# Patient Record
Sex: MALE | Race: BLACK OR AFRICAN AMERICAN | NOT HISPANIC OR LATINO | Employment: FULL TIME | RURAL
[De-identification: names, ages, dates, MRNs, and addresses within clinical notes are randomized per-mention and may not be internally consistent; named-entity substitution may affect disease eponyms.]

---

## 2021-12-07 ENCOUNTER — LAB VISIT (OUTPATIENT)
Dept: PRIMARY CARE CLINIC | Facility: CLINIC | Age: 31
End: 2021-12-07

## 2021-12-07 DIAGNOSIS — Z02.83 ENCOUNTER FOR EMPLOYMENT-RELATED DRUG TESTING: ICD-10-CM

## 2021-12-07 PROCEDURE — 99000 SPECIMEN HANDLING OFFICE-LAB: CPT | Mod: ,,, | Performed by: NURSE PRACTITIONER

## 2021-12-07 PROCEDURE — 99000 PR SPECIMEN HANDLING,DR OFF->LAB: ICD-10-PCS | Mod: ,,, | Performed by: NURSE PRACTITIONER

## 2022-01-08 ENCOUNTER — OFFICE VISIT (OUTPATIENT)
Dept: FAMILY MEDICINE | Facility: CLINIC | Age: 32
End: 2022-01-08
Payer: COMMERCIAL

## 2022-01-08 VITALS
WEIGHT: 216 LBS | HEART RATE: 107 BPM | DIASTOLIC BLOOD PRESSURE: 77 MMHG | SYSTOLIC BLOOD PRESSURE: 125 MMHG | TEMPERATURE: 98 F | OXYGEN SATURATION: 96 % | RESPIRATION RATE: 18 BRPM

## 2022-01-08 DIAGNOSIS — Z20.828 EXPOSURE TO VIRAL DISEASE: Primary | ICD-10-CM

## 2022-01-08 LAB
CTP QC/QA: YES
SARS-COV-2 AG RESP QL IA.RAPID: NEGATIVE

## 2022-01-08 PROCEDURE — 87426 SARS CORONAVIRUS 2 ANTIGEN POCT: ICD-10-PCS | Mod: QW,,, | Performed by: NURSE PRACTITIONER

## 2022-01-08 PROCEDURE — 99202 PR OFFICE/OUTPT VISIT, NEW, LEVL II, 15-29 MIN: ICD-10-PCS | Mod: ,,, | Performed by: NURSE PRACTITIONER

## 2022-01-08 PROCEDURE — 99051 MED SERV EVE/WKEND/HOLIDAY: CPT | Mod: ,,, | Performed by: NURSE PRACTITIONER

## 2022-01-08 PROCEDURE — 99051 PR MEDICAL SERVICES, EVE/WKEND/HOLIDAY: ICD-10-PCS | Mod: ,,, | Performed by: NURSE PRACTITIONER

## 2022-01-08 PROCEDURE — 99202 OFFICE O/P NEW SF 15 MIN: CPT | Mod: ,,, | Performed by: NURSE PRACTITIONER

## 2022-01-08 PROCEDURE — 87426 SARSCOV CORONAVIRUS AG IA: CPT | Mod: QW,,, | Performed by: NURSE PRACTITIONER

## 2022-01-08 NOTE — PROGRESS NOTES
Subjective:       Patient ID: Alan Pretty is a 31 y.o. male.    Chief Complaint: COVID-19 Concerns (Exposed to positive. No symptoms.)    Exposure to Covid  No symptoms    Review of Systems   Constitutional: Negative for appetite change, fatigue and fever.   HENT: Negative for nasal congestion, ear pain and sore throat.    Eyes: Negative for pain, discharge and itching.   Respiratory: Negative for cough and shortness of breath.    Cardiovascular: Negative for chest pain and leg swelling.   Gastrointestinal: Negative for abdominal pain, change in bowel habit, nausea, vomiting and change in bowel habit.   Musculoskeletal: Negative for back pain, gait problem and neck pain.   Integumentary:  Negative for rash and wound.   Allergic/Immunologic: Negative for immunocompromised state.   Neurological: Negative for dizziness, weakness and headaches.   All other systems reviewed and are negative.        Objective:      Physical Exam  Vitals and nursing note reviewed.   Constitutional:       General: He is not in acute distress.     Appearance: Normal appearance. He is not ill-appearing, toxic-appearing or diaphoretic.   HENT:      Head: Normocephalic.      Right Ear: Tympanic membrane, ear canal and external ear normal.      Left Ear: Tympanic membrane, ear canal and external ear normal.      Nose: Nose normal. No congestion or rhinorrhea.      Mouth/Throat:      Mouth: Mucous membranes are moist.      Pharynx: Posterior oropharyngeal erythema present. No oropharyngeal exudate.   Eyes:      General: No scleral icterus.        Right eye: No discharge.         Left eye: No discharge.      Extraocular Movements: Extraocular movements intact.      Conjunctiva/sclera: Conjunctivae normal.      Pupils: Pupils are equal, round, and reactive to light.   Cardiovascular:      Rate and Rhythm: Normal rate and regular rhythm.      Pulses: Normal pulses.      Heart sounds: Normal heart sounds. No murmur heard.      Pulmonary:       Effort: Pulmonary effort is normal. No respiratory distress.      Breath sounds: Normal breath sounds. No wheezing, rhonchi or rales.   Musculoskeletal:         General: Normal range of motion.      Cervical back: Neck supple. No tenderness.   Lymphadenopathy:      Cervical: No cervical adenopathy.   Skin:     General: Skin is warm and dry.      Capillary Refill: Capillary refill takes less than 2 seconds.      Findings: No rash.   Neurological:      Mental Status: He is alert and oriented to person, place, and time.   Psychiatric:         Mood and Affect: Mood normal.         Behavior: Behavior normal.         Thought Content: Thought content normal.         Judgment: Judgment normal.         Office Visit on 01/08/2022   Component Date Value Ref Range Status    SARS Coronavirus 2 Antigen 01/08/2022 Negative  Negative Final     Acceptable 01/08/2022 Yes   Final      Assessment:       1. Exposure to viral disease        Plan:   Exposure to viral disease  -     SARS Coronavirus 2 Antigen, POCT

## 2022-01-08 NOTE — PATIENT INSTRUCTIONS
Your test was NEGATIVE for COVID-19 (coronavirus).      You may leave home and/or return to work when the following conditions are met:  · 24 hours fever free without fever-reducing medications AND  · Improved symptoms  · You are fully vaccinated or have not had close contact with someone with COVID-19 (within 6 feet for 15 minutes or more)    If you are fully vaccinated and had a close contact, there is no need for quarantine, unless you develop symptoms.      If you are not fully vaccinated and had a close contact:  · Retest at 5 to 7 days post-exposure  · If possible, it is recommended that you quarantine for 5 days from the time of contact regardless of your test status.  · A mask should be worn indoors post quarantine.    If your symptoms worsen or if you have any other concerns, please contact [facility] at [phone].     Sincerely,    CHARITY Gee

## 2022-01-08 NOTE — LETTER
January 8, 2022      [facility]  [address] [place] [address]  Phone: [phone]  Fax: [phone]       Patient: Alan Pretty   YOB: 1990  Date of Visit: 01/08/2022    To Whom It May Concern:    TROY Pretty  was at [facility] on 01/08/2022. The patient may return to work/school on 01/09/2022 without restrictions. If you have any questions or concerns, or if I can be of further assistance, please do not hesitate to contact me.    Sincerely,    CHARITY Gee

## 2022-04-02 ENCOUNTER — HOSPITAL ENCOUNTER (EMERGENCY)
Facility: HOSPITAL | Age: 32
Discharge: HOME | End: 2022-04-02
Payer: COMMERCIAL

## 2022-04-02 VITALS
WEIGHT: 198 LBS | HEART RATE: 89 BPM | TEMPERATURE: 99 F | BODY MASS INDEX: 26.82 KG/M2 | DIASTOLIC BLOOD PRESSURE: 74 MMHG | RESPIRATION RATE: 16 BRPM | HEIGHT: 72 IN | OXYGEN SATURATION: 97 % | SYSTOLIC BLOOD PRESSURE: 126 MMHG
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
## 2022-04-02 DIAGNOSIS — Z20.828 EXPOSURE TO VIRAL DISEASE: ICD-10-CM

## 2022-04-02 DIAGNOSIS — M25.529 ELBOW PAIN, UNSPECIFIED LATERALITY: Primary | ICD-10-CM

## 2022-04-02 PROCEDURE — 99283 EMERGENCY DEPT VISIT LOW MDM: CPT

## 2022-04-02 PROCEDURE — 99282 PR EMERGENCY DEPT VISIT,LEVEL II: ICD-10-PCS | Mod: ,,, | Performed by: NURSE PRACTITIONER

## 2022-04-02 PROCEDURE — 99282 EMERGENCY DEPT VISIT SF MDM: CPT | Mod: ,,, | Performed by: NURSE PRACTITIONER

## 2022-04-02 NOTE — Clinical Note
"Alan GLASSDEBRA" Sukhwinder was seen and treated in our emergency department on 4/2/2022.  He may return to work on 04/06/2022.  If unable to return to work by 04/06/2022 follow-up with primary care provider for further work excuse     If you have any questions or concerns, please don't hesitate to call.      CHARITY Reeves"

## 2022-04-02 NOTE — ED PROVIDER NOTES
Encounter Date: 4/2/2022       History     Chief Complaint   Patient presents with    Joint Swelling     32-year-old male patient presents ambulatory to the emergency department complaining pain in his dominant right elbow.  He relates a history that on 03/26/2022 during the early morning hours at approximately 1:30 a.m. he had been on a horse ride.  They were loading 4 wheelers back up.  Another rider had left the tail gait down on the trailer.  In the dark he inadvertently stumbled over the trailer falling on his dominant right arm.  States that his mother did evaluate his arm and total knee need to go and get it checked however he felt that would be okay.  He  did have instant swelling and pain.  States he is unable to fully extend his right elbow.  Denies any other injury or trauma.        Review of patient's allergies indicates:  No Known Allergies  History reviewed. No pertinent past medical history.  History reviewed. No pertinent surgical history.  History reviewed. No pertinent family history.  Social History     Tobacco Use    Smoking status: Current Every Day Smoker     Packs/day: 1.00     Types: Cigarettes    Smokeless tobacco: Never Used   Substance Use Topics    Alcohol use: Not Currently    Drug use: Never     Review of Systems   Constitutional: Negative for activity change, fatigue and fever.   Musculoskeletal: Positive for joint swelling. Negative for back pain and neck pain.   Skin:        Swelling right elbow   Neurological: Negative for weakness.       Physical Exam     Initial Vitals [04/02/22 1406]   BP Pulse Resp Temp SpO2   126/74 89 16 98.6 °F (37 °C) 97 %      MAP       --         Physical Exam    Constitutional: He appears well-developed and well-nourished.   Musculoskeletal:      Comments: Range elbow right flex to approximately 135°.  He is unable to extend past 90°.  Stable to supinate for an 8 forearm 90°.  Range of motion right shoulder is intact.  Shoulder has normal contour.   There is no tenderness with motion of the shoulder.  Capillary refill all digits of the right hand less than 3 seconds.  Right radial pulse 4/5.     Neurological: He is alert.         Medical Screening Exam   See Full Note    ED Course   Procedures  Labs Reviewed - No data to display       Imaging Results          X-Ray Elbow 2 Views Right (Final result)  Result time 04/02/22 14:33:31   Procedure changed from X-Ray Elbow Complete Right     Final result by Abdi Zamora DO (04/02/22 14:33:31)                 Impression:      As above.      Electronically signed by: Abdi Zamora  Date:    04/02/2022  Time:    14:33             Narrative:    EXAMINATION:  XR ELBOW 2 VIEWS RIGHT    CLINICAL HISTORY:  joint swelling;Unspecified fall, initial encounter    TECHNIQUE:  XR ELBOW 2 VIEWS RIGHT    COMPARISON:  Comparisons were reviewed, if available.    FINDINGS:  No acute fracture or dislocation.    No joint abnormality.    No radiopaque foreign bodies.                                 Medications - No data to display                    Clinical Impression:   Final diagnoses:  [W19.XXXA] Fall  [M25.529] Elbow pain, unspecified laterality (Primary)          ED Disposition Condition    Discharge Stable        ED Prescriptions     None        Follow-up Information     Follow up With Specialties Details Why Contact Info    Mitch Brar MD Orthopedic Surgery   [address]   [address] [address]  [phone]             CHARITY Reeves  04/02/22 1503       CHARITY Reeves  04/02/22 1504       CHARITY Reeves  04/02/22 1534

## 2022-04-02 NOTE — DISCHARGE INSTRUCTIONS
May ice elbow for comfort.  Do not ice for more than 20 minutes at a time or to the skin becomes numb.  Arm sling for comfort.  Gentle range of motion elbow wrist forearm and hand.

## 2022-09-25 ENCOUNTER — OFFICE VISIT (OUTPATIENT)
Dept: FAMILY MEDICINE | Facility: CLINIC | Age: 32
End: 2022-09-25
Payer: COMMERCIAL

## 2022-09-25 VITALS
OXYGEN SATURATION: 98 % | WEIGHT: 198 LBS | HEIGHT: 72 IN | DIASTOLIC BLOOD PRESSURE: 84 MMHG | RESPIRATION RATE: 20 BRPM | SYSTOLIC BLOOD PRESSURE: 119 MMHG | TEMPERATURE: 98 F | BODY MASS INDEX: 26.82 KG/M2 | HEART RATE: 72 BPM

## 2022-09-25 DIAGNOSIS — L02.01 CUTANEOUS ABSCESS OF FACE: Primary | ICD-10-CM

## 2022-09-25 PROCEDURE — 99213 PR OFFICE/OUTPT VISIT, EST, LEVL III, 20-29 MIN: ICD-10-PCS | Mod: ,,, | Performed by: NURSE PRACTITIONER

## 2022-09-25 PROCEDURE — 99051 PR MEDICAL SERVICES, EVE/WKEND/HOLIDAY: ICD-10-PCS | Mod: ,,, | Performed by: NURSE PRACTITIONER

## 2022-09-25 PROCEDURE — 99213 OFFICE O/P EST LOW 20 MIN: CPT | Mod: ,,, | Performed by: NURSE PRACTITIONER

## 2022-09-25 PROCEDURE — 99051 MED SERV EVE/WKEND/HOLIDAY: CPT | Mod: ,,, | Performed by: NURSE PRACTITIONER

## 2022-09-25 RX ORDER — MUPIROCIN 20 MG/G
OINTMENT TOPICAL 3 TIMES DAILY
Qty: 30 G | Refills: 1 | Status: SHIPPED | OUTPATIENT
Start: 2022-09-25

## 2022-09-25 RX ORDER — AMOXICILLIN AND CLAVULANATE POTASSIUM 875; 125 MG/1; MG/1
1 TABLET, FILM COATED ORAL EVERY 12 HOURS
Qty: 14 TABLET | Refills: 0 | Status: SHIPPED | OUTPATIENT
Start: 2022-09-25

## 2022-09-25 RX ORDER — SULFAMETHOXAZOLE AND TRIMETHOPRIM 800; 160 MG/1; MG/1
1 TABLET ORAL 2 TIMES DAILY
Qty: 14 TABLET | Refills: 0 | Status: SHIPPED | OUTPATIENT
Start: 2022-09-25

## 2022-09-27 PROBLEM — L02.01 CUTANEOUS ABSCESS OF FACE: Status: ACTIVE | Noted: 2022-09-27

## 2022-09-27 NOTE — PROGRESS NOTES
PCP: CHARITY Webber    Referring Provider:     Subjective:   Alan Pretty is a 32 y.o. male who presents with c/o abscess beside left eye x 1 week. Denies fever or chills. Reports it started like normal bump, but has worsened over the past week.          No results found for: NA, K, CL, CO2, BUN, CREATININE, CALCIUM, ANIONGAP, ESTGFRAFRICA, EGFRNONAA    No results found for: CHOL  No results found for: HDL  No results found for: LDLCALC  No results found for: TRIG  No results found for: CHOLHDL    No results found for: WBC, HGB, HCT, MCV, PLT        Current Outpatient Medications:     amoxicillin-clavulanate 875-125mg (AUGMENTIN) 875-125 mg per tablet, Take 1 tablet by mouth every 12 (twelve) hours., Disp: 14 tablet, Rfl: 0    mupirocin (BACTROBAN) 2 % ointment, Apply topically 3 (three) times daily., Disp: 30 g, Rfl: 1    sulfamethoxazole-trimethoprim 800-160mg (BACTRIM DS) 800-160 mg Tab, Take 1 tablet by mouth 2 (two) times daily., Disp: 14 tablet, Rfl: 0    Review of Systems   Constitutional:  Negative for chills and fever.   HENT:  Negative for congestion, ear pain and sore throat.    Eyes:  Negative for blurred vision and double vision.   Respiratory: Negative.     Cardiovascular: Negative.    Gastrointestinal: Negative.    Skin:         abscess       Objective:   /84 (BP Location: Right arm, Patient Position: Sitting, BP Method: Large (Automatic))   Pulse 72   Temp 98.4 °F (36.9 °C)   Resp 20   Ht 6' (1.829 m)   Wt 89.8 kg (198 lb)   SpO2 98%   BMI 26.85 kg/m²     Physical Exam  Vitals reviewed.   Constitutional:       General: He is not in acute distress.  HENT:      Head:        Comments: Approx 2cm, fluctuant abscess.  Copious amount of purulent drainage expressed with manual pressure. Small amount of firm exudate still palpable, may need I & D.   Cardiovascular:      Rate and Rhythm: Normal rate and regular rhythm.   Pulmonary:      Effort: Pulmonary effort is normal.      Breath  sounds: Normal breath sounds.   Lymphadenopathy:      Cervical: No cervical adenopathy.   Neurological:      Mental Status: He is alert and oriented to person, place, and time.       Assessment:     1. Cutaneous abscess of face  mupirocin (BACTROBAN) 2 % ointment    amoxicillin-clavulanate 875-125mg (AUGMENTIN) 875-125 mg per tablet    sulfamethoxazole-trimethoprim 800-160mg (BACTRIM DS) 800-160 mg Tab    Ambulatory referral/consult to ENT            Plan:   Cutaneous abscess of face  Approx 2cm, fluctuant abscess.  Copious amount of purulent drainage expressed with manual pressure. Small amount of firm exudate still palpable, may need I & D.     Warm moist compresses 4 x days  Mupirocin TID  Bactrim   Augmentin  Refer to ENT for evaluation for possible I & D  Follow up sooner if symptoms persist or worsen prior to referral

## 2022-09-27 NOTE — ASSESSMENT & PLAN NOTE
Approx 2cm, fluctuant abscess.  Copious amount of purulent drainage expressed with manual pressure. Small amount of firm exudate still palpable, may need I & D.     Warm moist compresses 4 x days  Mupirocin TID  Bactrim   Augmentin  Refer to ENT for evaluation for possible I & D  Follow up sooner if symptoms persist or worsen prior to referral

## 2024-10-31 ENCOUNTER — OFFICE VISIT (OUTPATIENT)
Dept: FAMILY MEDICINE | Facility: CLINIC | Age: 34
End: 2024-10-31
Payer: COMMERCIAL

## 2024-10-31 VITALS
DIASTOLIC BLOOD PRESSURE: 82 MMHG | SYSTOLIC BLOOD PRESSURE: 130 MMHG | WEIGHT: 243.19 LBS | HEART RATE: 80 BPM | OXYGEN SATURATION: 97 % | TEMPERATURE: 98 F | HEIGHT: 72 IN | BODY MASS INDEX: 32.94 KG/M2 | RESPIRATION RATE: 18 BRPM

## 2024-10-31 DIAGNOSIS — R22.0 SWELLING, MASS, OR LUMP ON FACE: Primary | ICD-10-CM

## 2024-10-31 RX ORDER — TIZANIDINE 4 MG/1
4 TABLET ORAL EVERY 6 HOURS PRN
COMMUNITY

## 2024-10-31 RX ORDER — DICLOFENAC SODIUM 75 MG/1
75 TABLET, DELAYED RELEASE ORAL 2 TIMES DAILY
COMMUNITY

## 2025-04-04 ENCOUNTER — OFFICE VISIT (OUTPATIENT)
Dept: FAMILY MEDICINE | Facility: CLINIC | Age: 35
End: 2025-04-04
Payer: COMMERCIAL

## 2025-04-04 VITALS
DIASTOLIC BLOOD PRESSURE: 68 MMHG | HEART RATE: 88 BPM | TEMPERATURE: 99 F | HEIGHT: 72 IN | WEIGHT: 240 LBS | SYSTOLIC BLOOD PRESSURE: 134 MMHG | BODY MASS INDEX: 32.51 KG/M2 | OXYGEN SATURATION: 99 % | RESPIRATION RATE: 20 BRPM

## 2025-04-04 DIAGNOSIS — M79.675 PAIN IN TOE OF LEFT FOOT: Primary | ICD-10-CM

## 2025-04-04 NOTE — LETTER
April 4, 2025      [facility]  [address] [place] [address]  Phone: [phone]       Patient: Juliana Pretty   YOB: 1990  Date of Visit: 04/04/2025    To Whom It May Concern:    TROY Pretty  was at [facility] on 04/04/2025. The patient may return to work/school on 4/9/2025 with no restrictions. Please also excuse JULIANA on 4/1/2025-4/3/2025.If you have any questions or concerns, or if I can be of further assistance, please do not hesitate to contact me.    Sincerely,    MARIS SnellC

## 2025-04-04 NOTE — PROGRESS NOTES
Subjective     Patient ID: Alan Pretty is a 35 y.o. male.    Chief Complaint: poss broken toe (Nxt to last on left )    HPI  Review of Systems  History of Present Illness    CHIEF COMPLAINT:  Patient presents today for left foot toe injury.    HISTORY OF PRESENT ILLNESS:  He sustained injury to left fourth toe at 2:00 AM Monday morning after hitting toe on door. He is unable to bear weight on the affected toe and requires heel walking for ambulation. Initial swelling involved the entire toe with sensation of poking from adjacent toe. He has been kenya wrapping the toe which provides some relief.    CURRENT MEDICATIONS:  He is taking Ibuprofen for pain management.      ROS:  General: -fever, -chills, -fatigue, -weight gain, -weight loss  Eyes: -vision changes, -redness, -discharge  ENT: -ear pain, -nasal congestion, -sore throat  Cardiovascular: -chest pain, -palpitations, +lower extremity edema  Respiratory: -cough, -shortness of breath  Gastrointestinal: -abdominal pain, -nausea, -vomiting, -diarrhea, -constipation, -blood in stool  Genitourinary: -dysuria, -hematuria, -frequency  Musculoskeletal: -joint pain, -muscle pain, +limb pain, +difficulty walking  Skin: -rash, -lesion  Neurological: -headache, -dizziness, -numbness, -tingling  Psychiatric: -anxiety, -depression, -sleep difficulty             Objective     Physical Exam  Vitals and nursing note reviewed.   Constitutional:       Appearance: Normal appearance.   HENT:      Head: Normocephalic and atraumatic.      Nose: Nose normal.      Mouth/Throat:      Mouth: Mucous membranes are moist.      Pharynx: Oropharynx is clear.   Eyes:      Extraocular Movements: Extraocular movements intact.      Conjunctiva/sclera: Conjunctivae normal.      Pupils: Pupils are equal, round, and reactive to light.   Cardiovascular:      Rate and Rhythm: Normal rate and regular rhythm.      Pulses: Normal pulses.      Heart sounds: Normal heart sounds.   Pulmonary:       Effort: Pulmonary effort is normal.      Breath sounds: Normal breath sounds.   Abdominal:      General: Abdomen is flat. Bowel sounds are normal.      Palpations: Abdomen is soft.   Musculoskeletal:         General: Normal range of motion.      Cervical back: Normal range of motion and neck supple.   Feet:      Comments: Left 4th toe tender to the touch  Skin:     General: Skin is warm and dry.      Capillary Refill: Capillary refill takes less than 2 seconds.   Neurological:      General: No focal deficit present.      Mental Status: He is alert and oriented to person, place, and time.   Psychiatric:         Behavior: Behavior normal.         Thought Content: Thought content normal.            Assessment and Plan     1. Pain in toe of left foot  -     X-Ray Foot Complete Left; Future; Expected date: 04/04/2025      Assessment & Plan      X-ray ordered, will review once available  Rest, ice, and elevate       Follow up if symptoms worsen or fail to improve.    This note was generated with the assistance of ambient listening technology. Verbal consent was obtained by the patient and accompanying visitor(s) for the recording of patient appointment to facilitate this note. I attest to having reviewed and edited the generated note for accuracy, though some syntax or spelling errors may persist. Please contact the author of this note for any clarification.